# Patient Record
Sex: FEMALE
[De-identification: names, ages, dates, MRNs, and addresses within clinical notes are randomized per-mention and may not be internally consistent; named-entity substitution may affect disease eponyms.]

---

## 2022-07-18 ENCOUNTER — APPOINTMENT (OUTPATIENT)
Dept: ORTHOPEDIC SURGERY | Facility: CLINIC | Age: 22
End: 2022-07-18

## 2022-07-18 VITALS — HEIGHT: 65 IN | BODY MASS INDEX: 26.66 KG/M2 | WEIGHT: 160 LBS

## 2022-07-18 DIAGNOSIS — Z00.00 ENCOUNTER FOR GENERAL ADULT MEDICAL EXAMINATION W/OUT ABNORMAL FINDINGS: ICD-10-CM

## 2022-07-18 DIAGNOSIS — Z78.9 OTHER SPECIFIED HEALTH STATUS: ICD-10-CM

## 2022-07-18 PROCEDURE — 99203 OFFICE O/P NEW LOW 30 MIN: CPT

## 2022-07-18 PROCEDURE — 72170 X-RAY EXAM OF PELVIS: CPT

## 2022-07-18 PROCEDURE — 72100 X-RAY EXAM L-S SPINE 2/3 VWS: CPT

## 2022-07-18 NOTE — PHYSICAL EXAM
[] : light touch intact throughout both lower extremities [No bony abnormalities] : No bony abnormalities [Right] : right hip [All Views] : anteroposterior, lateral [There are no fractures, subluxations or dislocations. No significant abnormalities are seen] : There are no fractures, subluxations or dislocations. No significant abnormalities are seen

## 2022-07-18 NOTE — ASSESSMENT
[FreeTextEntry1] : 22F p/w resolved episode of back pain\par \par Continue conservative mgmt\par return prn\par \par The patient was advised of the diagnosis. The natural history of the pathology was explained in full to the patient in layman's terms. All questions were answered. The risks and benefits of surgical and non-surgical treatment alternatives were explained in full to the patient. \par

## 2022-07-18 NOTE — HISTORY OF PRESENT ILLNESS
[0] : 0 [de-identified] : 22F here with back pain from last year. No injury or trauma. She states pain came out of nowhere. Was seen at ER thinking it was a kidney stone. Pain resolved a few days after the ER visit. She has had no issues or complaints since the incident last year. Denies N/T. No radicular complaints.\par \par PMH: none [] : no [FreeTextEntry1] : lashon hips/ pelvis [FreeTextEntry5] :  SYLVESTER ANDREWS is a 22 year female who is here today for lashon hip and pelvis pain. She stated she was seen a year ago and was advised to follow up with orthopedic. She has no pain. She stated she had the pain for about 2 days a year ago. [de-identified] : 1 year ago [de-identified] : ER